# Patient Record
Sex: FEMALE | Race: WHITE | NOT HISPANIC OR LATINO
[De-identification: names, ages, dates, MRNs, and addresses within clinical notes are randomized per-mention and may not be internally consistent; named-entity substitution may affect disease eponyms.]

---

## 2019-07-09 ENCOUNTER — RESULT REVIEW (OUTPATIENT)
Age: 29
End: 2019-07-09

## 2019-07-10 ENCOUNTER — APPOINTMENT (OUTPATIENT)
Dept: OBGYN | Facility: CLINIC | Age: 29
End: 2019-07-10

## 2019-07-10 ENCOUNTER — NON-APPOINTMENT (OUTPATIENT)
Age: 29
End: 2019-07-10

## 2019-07-10 ENCOUNTER — APPOINTMENT (OUTPATIENT)
Dept: OBGYN | Facility: CLINIC | Age: 29
End: 2019-07-10
Payer: MEDICAID

## 2019-07-10 ENCOUNTER — OUTPATIENT (OUTPATIENT)
Dept: OUTPATIENT SERVICES | Facility: HOSPITAL | Age: 29
LOS: 1 days | End: 2019-07-10
Payer: MEDICAID

## 2019-07-10 VITALS
SYSTOLIC BLOOD PRESSURE: 101 MMHG | DIASTOLIC BLOOD PRESSURE: 67 MMHG | BODY MASS INDEX: 29.44 KG/M2 | HEIGHT: 62 IN | WEIGHT: 160 LBS

## 2019-07-10 DIAGNOSIS — Z34.93 ENCOUNTER FOR SUPERVISION OF NORMAL PREGNANCY, UNSPECIFIED, THIRD TRIMESTER: ICD-10-CM

## 2019-07-10 DIAGNOSIS — Z34.00 ENCOUNTER FOR SUPERVISION OF NORMAL FIRST PREGNANCY, UNSPECIFIED TRIMESTER: ICD-10-CM

## 2019-07-10 DIAGNOSIS — O09.33 SUPERVISION OF PREGNANCY WITH INSUFFICIENT ANTENATAL CARE, THIRD TRIMESTER: ICD-10-CM

## 2019-07-10 PROCEDURE — 99214 OFFICE O/P EST MOD 30 MIN: CPT | Mod: NC

## 2019-07-10 PROCEDURE — 86762 RUBELLA ANTIBODY: CPT

## 2019-07-10 PROCEDURE — 87902 NFCT AGT GNTYP ALYS HEP C: CPT

## 2019-07-10 PROCEDURE — 36415 COLL VENOUS BLD VENIPUNCTURE: CPT | Mod: NC

## 2019-07-10 PROCEDURE — 80076 HEPATIC FUNCTION PANEL: CPT

## 2019-07-10 PROCEDURE — 86787 VARICELLA-ZOSTER ANTIBODY: CPT

## 2019-07-10 PROCEDURE — 83655 ASSAY OF LEAD: CPT

## 2019-07-10 PROCEDURE — 86901 BLOOD TYPING SEROLOGIC RH(D): CPT

## 2019-07-10 PROCEDURE — 83020 HEMOGLOBIN ELECTROPHORESIS: CPT | Mod: 26

## 2019-07-10 PROCEDURE — 81003 URINALYSIS AUTO W/O SCOPE: CPT

## 2019-07-10 PROCEDURE — 87389 HIV-1 AG W/HIV-1&-2 AB AG IA: CPT

## 2019-07-10 PROCEDURE — 86780 TREPONEMA PALLIDUM: CPT

## 2019-07-10 PROCEDURE — 83020 HEMOGLOBIN ELECTROPHORESIS: CPT

## 2019-07-10 PROCEDURE — 86850 RBC ANTIBODY SCREEN: CPT

## 2019-07-10 PROCEDURE — 86900 BLOOD TYPING SEROLOGIC ABO: CPT

## 2019-07-10 PROCEDURE — 81025 URINE PREGNANCY TEST: CPT

## 2019-07-10 PROCEDURE — 87340 HEPATITIS B SURFACE AG IA: CPT

## 2019-07-10 PROCEDURE — G0463: CPT

## 2019-07-10 PROCEDURE — 87086 URINE CULTURE/COLONY COUNT: CPT

## 2019-07-10 PROCEDURE — 86480 TB TEST CELL IMMUN MEASURE: CPT

## 2019-07-10 PROCEDURE — 83036 HEMOGLOBIN GLYCOSYLATED A1C: CPT

## 2019-07-10 PROCEDURE — 82950 GLUCOSE TEST: CPT

## 2019-07-10 PROCEDURE — 36415 COLL VENOUS BLD VENIPUNCTURE: CPT

## 2019-07-11 LAB
ALBUMIN SERPL ELPH-MCNC: 3.4 G/DL — SIGNIFICANT CHANGE UP (ref 3.3–5)
ALP SERPL-CCNC: 150 U/L — HIGH (ref 40–120)
ALT FLD-CCNC: 31 U/L — SIGNIFICANT CHANGE UP (ref 10–45)
APPEARANCE UR: CLEAR — SIGNIFICANT CHANGE UP
AST SERPL-CCNC: 33 U/L — SIGNIFICANT CHANGE UP (ref 10–40)
BASOPHILS # BLD AUTO: 0.02 K/UL — SIGNIFICANT CHANGE UP (ref 0–0.2)
BASOPHILS NFR BLD AUTO: 0.2 % — SIGNIFICANT CHANGE UP (ref 0–2)
BILIRUB DIRECT SERPL-MCNC: 0.2 MG/DL — SIGNIFICANT CHANGE UP (ref 0–0.2)
BILIRUB INDIRECT FLD-MCNC: 0.2 MG/DL — SIGNIFICANT CHANGE UP (ref 0.2–1.2)
BILIRUB SERPL-MCNC: 0.3 MG/DL — SIGNIFICANT CHANGE UP (ref 0.2–1.2)
BILIRUB UR-MCNC: NEGATIVE — SIGNIFICANT CHANGE UP
COLOR SPEC: YELLOW — SIGNIFICANT CHANGE UP
DIFF PNL FLD: NEGATIVE — SIGNIFICANT CHANGE UP
EOSINOPHIL # BLD AUTO: 0.06 K/UL — SIGNIFICANT CHANGE UP (ref 0–0.5)
EOSINOPHIL NFR BLD AUTO: 0.7 % — SIGNIFICANT CHANGE UP (ref 0–6)
GLUCOSE 1H P GLC SERPL-MCNC: 125 MG/DL — SIGNIFICANT CHANGE UP (ref 70–199)
GLUCOSE UR QL: NEGATIVE — SIGNIFICANT CHANGE UP
HBA1C BLD-MCNC: 5 % — SIGNIFICANT CHANGE UP (ref 4–5.6)
HBV SURFACE AG SER-ACNC: SIGNIFICANT CHANGE UP
HCT VFR BLD CALC: 35.9 % — SIGNIFICANT CHANGE UP (ref 34.5–45)
HEMOGLOBIN INTERPRETATION: SIGNIFICANT CHANGE UP
HGB A MFR BLD: 97.1 % — SIGNIFICANT CHANGE UP (ref 95.8–98)
HGB A2 MFR BLD: 2.9 % — SIGNIFICANT CHANGE UP (ref 2–3.2)
HGB BLD-MCNC: 11.2 G/DL — LOW (ref 11.5–15.5)
HIV 1+2 AB+HIV1 P24 AG SERPL QL IA: SIGNIFICANT CHANGE UP
IMM GRANULOCYTES NFR BLD AUTO: 0.7 % — SIGNIFICANT CHANGE UP (ref 0–1.5)
KETONES UR-MCNC: NEGATIVE — SIGNIFICANT CHANGE UP
LEUKOCYTE ESTERASE UR-ACNC: NEGATIVE — SIGNIFICANT CHANGE UP
LYMPHOCYTES # BLD AUTO: 2.02 K/UL — SIGNIFICANT CHANGE UP (ref 1–3.3)
LYMPHOCYTES # BLD AUTO: 23.5 % — SIGNIFICANT CHANGE UP (ref 13–44)
MCHC RBC-ENTMCNC: 28.6 PG — SIGNIFICANT CHANGE UP (ref 27–34)
MCHC RBC-ENTMCNC: 31.2 GM/DL — LOW (ref 32–36)
MCV RBC AUTO: 91.8 FL — SIGNIFICANT CHANGE UP (ref 80–100)
MONOCYTES # BLD AUTO: 0.78 K/UL — SIGNIFICANT CHANGE UP (ref 0–0.9)
MONOCYTES NFR BLD AUTO: 9.1 % — SIGNIFICANT CHANGE UP (ref 2–14)
NEUTROPHILS # BLD AUTO: 5.67 K/UL — SIGNIFICANT CHANGE UP (ref 1.8–7.4)
NEUTROPHILS NFR BLD AUTO: 65.8 % — SIGNIFICANT CHANGE UP (ref 43–77)
NITRITE UR-MCNC: NEGATIVE — SIGNIFICANT CHANGE UP
PH UR: 6.5 — SIGNIFICANT CHANGE UP (ref 5–8)
PLATELET # BLD AUTO: 177 K/UL — SIGNIFICANT CHANGE UP (ref 150–400)
PROT SERPL-MCNC: 6.3 G/DL — SIGNIFICANT CHANGE UP (ref 6–8.3)
PROT UR-MCNC: SIGNIFICANT CHANGE UP
RBC # BLD: 3.91 M/UL — SIGNIFICANT CHANGE UP (ref 3.8–5.2)
RBC # FLD: 14.1 % — SIGNIFICANT CHANGE UP (ref 10.3–14.5)
RUBV IGG SER-ACNC: 4.8 INDEX — SIGNIFICANT CHANGE UP
RUBV IGG SER-IMP: POSITIVE — SIGNIFICANT CHANGE UP
SP GR SPEC: 1.02 — SIGNIFICANT CHANGE UP (ref 1.01–1.02)
T PALLIDUM AB TITR SER: NEGATIVE — SIGNIFICANT CHANGE UP
UROBILINOGEN FLD QL: SIGNIFICANT CHANGE UP
VZV IGG FLD QL IA: 231 INDEX — SIGNIFICANT CHANGE UP
VZV IGG FLD QL IA: POSITIVE — SIGNIFICANT CHANGE UP
WBC # BLD: 8.61 K/UL — SIGNIFICANT CHANGE UP (ref 3.8–10.5)
WBC # FLD AUTO: 8.61 K/UL — SIGNIFICANT CHANGE UP (ref 3.8–10.5)

## 2019-07-12 ENCOUNTER — OUTPATIENT (OUTPATIENT)
Dept: OUTPATIENT SERVICES | Facility: HOSPITAL | Age: 29
LOS: 1 days | End: 2019-07-12
Payer: MEDICAID

## 2019-07-12 ENCOUNTER — APPOINTMENT (OUTPATIENT)
Dept: OBGYN | Facility: CLINIC | Age: 29
End: 2019-07-12

## 2019-07-12 DIAGNOSIS — Z00.00 ENCOUNTER FOR GENERAL ADULT MEDICAL EXAMINATION WITHOUT ABNORMAL FINDINGS: ICD-10-CM

## 2019-07-12 LAB
CULTURE RESULTS: SIGNIFICANT CHANGE UP
SPECIMEN SOURCE: SIGNIFICANT CHANGE UP

## 2019-07-12 PROCEDURE — G0463: CPT

## 2019-07-12 PROCEDURE — 96372 THER/PROPH/DIAG INJ SC/IM: CPT

## 2019-07-12 PROCEDURE — 81220 CFTR GENE COM VARIANTS: CPT

## 2019-07-13 LAB
GAMMA INTERFERON BACKGROUND BLD IA-ACNC: 0.04 IU/ML — SIGNIFICANT CHANGE UP
M TB IFN-G BLD-IMP: NEGATIVE — SIGNIFICANT CHANGE UP
M TB IFN-G CD4+ BCKGRND COR BLD-ACNC: 0 IU/ML — SIGNIFICANT CHANGE UP
M TB IFN-G CD4+CD8+ BCKGRND COR BLD-ACNC: -0.01 IU/ML — SIGNIFICANT CHANGE UP
QUANT TB PLUS MITOGEN MINUS NIL: 7.56 IU/ML — SIGNIFICANT CHANGE UP

## 2019-07-16 DIAGNOSIS — Z3A.32 32 WEEKS GESTATION OF PREGNANCY: ICD-10-CM

## 2019-07-16 DIAGNOSIS — Z34.93 ENCOUNTER FOR SUPERVISION OF NORMAL PREGNANCY, UNSPECIFIED, THIRD TRIMESTER: ICD-10-CM

## 2019-07-16 DIAGNOSIS — O36.0191 MATERNAL CARE FOR ANTI-D [RH] ANTIBODIES, UNSPECIFIED TRIMESTER, FETUS 1: ICD-10-CM

## 2019-07-16 DIAGNOSIS — F11.20 OPIOID DEPENDENCE, UNCOMPLICATED: ICD-10-CM

## 2019-07-16 DIAGNOSIS — B18.2 CHRONIC VIRAL HEPATITIS C: ICD-10-CM

## 2019-07-16 LAB — LEAD BLD-MCNC: 2 UG/DL — SIGNIFICANT CHANGE UP (ref 0–4)

## 2019-07-18 ENCOUNTER — APPOINTMENT (OUTPATIENT)
Dept: ANTEPARTUM | Facility: CLINIC | Age: 29
End: 2019-07-18
Payer: MEDICAID

## 2019-07-18 ENCOUNTER — ASOB RESULT (OUTPATIENT)
Age: 29
End: 2019-07-18

## 2019-07-18 LAB
CYSTIC FIBROSIS EXPANDED PANEL: SIGNIFICANT CHANGE UP
GRAZOPREVIR RESISTANCE: SIGNIFICANT CHANGE UP
HCV NS3 MUT DET ISLT GENOTYP: SIGNIFICANT CHANGE UP
PARITAPREVIR RESISTANCE: SIGNIFICANT CHANGE UP
SIMPREVIR RESISTANCE: SIGNIFICANT CHANGE UP

## 2019-07-18 PROCEDURE — 76816 OB US FOLLOW-UP PER FETUS: CPT

## 2019-07-22 ENCOUNTER — APPOINTMENT (OUTPATIENT)
Dept: OBGYN | Facility: HOSPITAL | Age: 29
End: 2019-07-22

## 2019-08-10 ENCOUNTER — INPATIENT (INPATIENT)
Facility: HOSPITAL | Age: 29
LOS: 2 days | Discharge: HOME | End: 2019-08-13
Attending: SPECIALIST | Admitting: SPECIALIST
Payer: MEDICAID

## 2019-08-10 ENCOUNTER — EMERGENCY (EMERGENCY)
Facility: HOSPITAL | Age: 29
LOS: 1 days | Discharge: STILL ADMITTED OR ADMINISTRATIVE | End: 2019-08-10
Admitting: EMERGENCY MEDICINE

## 2019-08-10 VITALS
DIASTOLIC BLOOD PRESSURE: 79 MMHG | TEMPERATURE: 98 F | SYSTOLIC BLOOD PRESSURE: 126 MMHG | RESPIRATION RATE: 16 BRPM | HEART RATE: 85 BPM

## 2019-08-10 VITALS
DIASTOLIC BLOOD PRESSURE: 79 MMHG | OXYGEN SATURATION: 99 % | RESPIRATION RATE: 16 BRPM | HEART RATE: 104 BPM | SYSTOLIC BLOOD PRESSURE: 132 MMHG | TEMPERATURE: 98 F

## 2019-08-10 DIAGNOSIS — Z3A.00 WEEKS OF GESTATION OF PREGNANCY NOT SPECIFIED: ICD-10-CM

## 2019-08-10 DIAGNOSIS — O26.899 OTHER SPECIFIED PREGNANCY RELATED CONDITIONS, UNSPECIFIED TRIMESTER: ICD-10-CM

## 2019-08-10 NOTE — ED ADULT NURSE NOTE - NS ED NURSE NOTE DISPO AOU DETAILS FT
Spoke to SHOSHANA Fenton pt okay to go to L&D. Pt transported with [facility].

## 2019-08-10 NOTE — OB RN TRIAGE NOTE - PMH
Drug abuse  on methadone  Hepatitis C    Miscarriage  NO D&C  Termination of pregnancy (fetus)  with D&C  Vaginal delivery  FT  5/29/16 , NO COMPLICATION

## 2019-08-10 NOTE — ED ADULT TRIAGE NOTE - CHIEF COMPLAINT QUOTE
Pt approx 38 weeks pregnant c/o rupture of membranes 45 min ago. Denies urge to push. Denies any pain/discomfort. LENA 9/1. Pt evaluated by MD Arana, pt okay to go to L&D per MD. Spoke to L&KATHARINE Fenton, pt okay to go to L&D.

## 2019-08-11 LAB
AMPHET UR-MCNC: NEGATIVE — SIGNIFICANT CHANGE UP
ANTIBODY ID 1_1: SIGNIFICANT CHANGE UP
BARBITURATES UR SCN-MCNC: NEGATIVE — SIGNIFICANT CHANGE UP
BASOPHILS # BLD AUTO: 0.02 K/UL — SIGNIFICANT CHANGE UP (ref 0–0.2)
BASOPHILS # BLD AUTO: 0.03 K/UL — SIGNIFICANT CHANGE UP (ref 0–0.2)
BASOPHILS NFR BLD AUTO: 0.2 % — SIGNIFICANT CHANGE UP (ref 0–2)
BASOPHILS NFR BLD AUTO: 0.3 % — SIGNIFICANT CHANGE UP (ref 0–2)
BENZODIAZ UR-MCNC: NEGATIVE — SIGNIFICANT CHANGE UP
BLD GP AB SCN SERPL QL: POSITIVE — SIGNIFICANT CHANGE UP
CANNABINOIDS UR-MCNC: NEGATIVE — SIGNIFICANT CHANGE UP
COCAINE METAB.OTHER UR-MCNC: NEGATIVE — SIGNIFICANT CHANGE UP
DAT POLY-SP REAG RBC QL: NEGATIVE — SIGNIFICANT CHANGE UP
EOSINOPHIL # BLD AUTO: 0.09 K/UL — SIGNIFICANT CHANGE UP (ref 0–0.5)
EOSINOPHIL # BLD AUTO: 0.16 K/UL — SIGNIFICANT CHANGE UP (ref 0–0.5)
EOSINOPHIL NFR BLD AUTO: 0.7 % — SIGNIFICANT CHANGE UP (ref 0–6)
EOSINOPHIL NFR BLD AUTO: 2.3 % — SIGNIFICANT CHANGE UP (ref 0–6)
HCT VFR BLD CALC: 33.7 % — LOW (ref 34.5–45)
HCT VFR BLD CALC: 36.3 % — SIGNIFICANT CHANGE UP (ref 34.5–45)
HGB BLD-MCNC: 10.8 G/DL — LOW (ref 11.5–15.5)
HGB BLD-MCNC: 11.6 G/DL — SIGNIFICANT CHANGE UP (ref 11.5–15.5)
IMM GRANULOCYTES NFR BLD AUTO: 0.5 % — SIGNIFICANT CHANGE UP (ref 0–1.5)
IMM GRANULOCYTES NFR BLD AUTO: 0.7 % — SIGNIFICANT CHANGE UP (ref 0–1.5)
LYMPHOCYTES # BLD AUTO: 1.41 K/UL — SIGNIFICANT CHANGE UP (ref 1–3.3)
LYMPHOCYTES # BLD AUTO: 1.78 K/UL — SIGNIFICANT CHANGE UP (ref 1–3.3)
LYMPHOCYTES # BLD AUTO: 10.9 % — LOW (ref 13–44)
LYMPHOCYTES # BLD AUTO: 25.4 % — SIGNIFICANT CHANGE UP (ref 13–44)
MCHC RBC-ENTMCNC: 28.5 PG — SIGNIFICANT CHANGE UP (ref 27–34)
MCHC RBC-ENTMCNC: 28.6 PG — SIGNIFICANT CHANGE UP (ref 27–34)
MCHC RBC-ENTMCNC: 32 % — SIGNIFICANT CHANGE UP (ref 32–36)
MCHC RBC-ENTMCNC: 32 % — SIGNIFICANT CHANGE UP (ref 32–36)
MCV RBC AUTO: 89.2 FL — SIGNIFICANT CHANGE UP (ref 80–100)
MCV RBC AUTO: 89.4 FL — SIGNIFICANT CHANGE UP (ref 80–100)
METHADONE UR-MCNC: POSITIVE — SIGNIFICANT CHANGE UP
MONOCYTES # BLD AUTO: 0.68 K/UL — SIGNIFICANT CHANGE UP (ref 0–0.9)
MONOCYTES # BLD AUTO: 1.1 K/UL — HIGH (ref 0–0.9)
MONOCYTES NFR BLD AUTO: 8.5 % — SIGNIFICANT CHANGE UP (ref 2–14)
MONOCYTES NFR BLD AUTO: 9.7 % — SIGNIFICANT CHANGE UP (ref 2–14)
NEUTROPHILS # BLD AUTO: 10.29 K/UL — HIGH (ref 1.8–7.4)
NEUTROPHILS # BLD AUTO: 4.31 K/UL — SIGNIFICANT CHANGE UP (ref 1.8–7.4)
NEUTROPHILS NFR BLD AUTO: 61.6 % — SIGNIFICANT CHANGE UP (ref 43–77)
NEUTROPHILS NFR BLD AUTO: 79.2 % — HIGH (ref 43–77)
NRBC # FLD: 0.02 K/UL — SIGNIFICANT CHANGE UP (ref 0–0)
NRBC # FLD: 0.03 K/UL — SIGNIFICANT CHANGE UP (ref 0–0)
OPIATES UR-MCNC: NEGATIVE — SIGNIFICANT CHANGE UP
OXYCODONE UR-MCNC: NEGATIVE — SIGNIFICANT CHANGE UP
PCP UR-MCNC: NEGATIVE — SIGNIFICANT CHANGE UP
PLATELET # BLD AUTO: 175 K/UL — SIGNIFICANT CHANGE UP (ref 150–400)
PLATELET # BLD AUTO: 178 K/UL — SIGNIFICANT CHANGE UP (ref 150–400)
PMV BLD: 11.3 FL — SIGNIFICANT CHANGE UP (ref 7–13)
PMV BLD: 11.7 FL — SIGNIFICANT CHANGE UP (ref 7–13)
RBC # BLD: 3.77 M/UL — LOW (ref 3.8–5.2)
RBC # BLD: 4.07 M/UL — SIGNIFICANT CHANGE UP (ref 3.8–5.2)
RBC # FLD: 15.3 % — HIGH (ref 10.3–14.5)
RBC # FLD: 15.6 % — HIGH (ref 10.3–14.5)
RH IG SCN BLD-IMP: NEGATIVE — SIGNIFICANT CHANGE UP
WBC # BLD: 12.98 K/UL — HIGH (ref 3.8–10.5)
WBC # BLD: 7 K/UL — SIGNIFICANT CHANGE UP (ref 3.8–10.5)
WBC # FLD AUTO: 12.98 K/UL — HIGH (ref 3.8–10.5)
WBC # FLD AUTO: 7 K/UL — SIGNIFICANT CHANGE UP (ref 3.8–10.5)

## 2019-08-11 PROCEDURE — 59409 OBSTETRICAL CARE: CPT | Mod: U9,UB,GC

## 2019-08-11 PROCEDURE — 86077 PHYS BLOOD BANK SERV XMATCH: CPT

## 2019-08-11 RX ORDER — METHADONE HYDROCHLORIDE 40 MG/1
175 TABLET ORAL
Refills: 0 | Status: DISCONTINUED | OUTPATIENT
Start: 2019-08-11 | End: 2019-08-11

## 2019-08-11 RX ORDER — DIBUCAINE 1 %
1 OINTMENT (GRAM) RECTAL EVERY 6 HOURS
Refills: 0 | Status: DISCONTINUED | OUTPATIENT
Start: 2019-08-11 | End: 2019-08-13

## 2019-08-11 RX ORDER — HYDROCORTISONE 1 %
1 OINTMENT (GRAM) TOPICAL EVERY 6 HOURS
Refills: 0 | Status: DISCONTINUED | OUTPATIENT
Start: 2019-08-11 | End: 2019-08-13

## 2019-08-11 RX ORDER — OXYCODONE HYDROCHLORIDE 5 MG/1
5 TABLET ORAL
Refills: 0 | Status: DISCONTINUED | OUTPATIENT
Start: 2019-08-11 | End: 2019-08-13

## 2019-08-11 RX ORDER — DIPHENHYDRAMINE HCL 50 MG
25 CAPSULE ORAL EVERY 6 HOURS
Refills: 0 | Status: DISCONTINUED | OUTPATIENT
Start: 2019-08-11 | End: 2019-08-13

## 2019-08-11 RX ORDER — PRAMOXINE HYDROCHLORIDE 150 MG/15G
1 AEROSOL, FOAM RECTAL EVERY 4 HOURS
Refills: 0 | Status: DISCONTINUED | OUTPATIENT
Start: 2019-08-11 | End: 2019-08-13

## 2019-08-11 RX ORDER — BENZOCAINE 10 %
1 GEL (GRAM) MUCOUS MEMBRANE EVERY 6 HOURS
Refills: 0 | Status: DISCONTINUED | OUTPATIENT
Start: 2019-08-11 | End: 2019-08-13

## 2019-08-11 RX ORDER — SODIUM CHLORIDE 9 MG/ML
3 INJECTION INTRAMUSCULAR; INTRAVENOUS; SUBCUTANEOUS EVERY 8 HOURS
Refills: 0 | Status: DISCONTINUED | OUTPATIENT
Start: 2019-08-11 | End: 2019-08-13

## 2019-08-11 RX ORDER — OXYTOCIN 10 UNIT/ML
2 VIAL (ML) INJECTION
Qty: 30 | Refills: 0 | Status: DISCONTINUED | OUTPATIENT
Start: 2019-08-11 | End: 2019-08-11

## 2019-08-11 RX ORDER — ALPRAZOLAM 0.25 MG
1 TABLET ORAL
Qty: 0 | Refills: 0 | DISCHARGE

## 2019-08-11 RX ORDER — SODIUM CHLORIDE 9 MG/ML
1000 INJECTION, SOLUTION INTRAVENOUS
Refills: 0 | Status: DISCONTINUED | OUTPATIENT
Start: 2019-08-11 | End: 2019-08-11

## 2019-08-11 RX ORDER — TETANUS TOXOID, REDUCED DIPHTHERIA TOXOID AND ACELLULAR PERTUSSIS VACCINE, ADSORBED 5; 2.5; 8; 8; 2.5 [IU]/.5ML; [IU]/.5ML; UG/.5ML; UG/.5ML; UG/.5ML
0.5 SUSPENSION INTRAMUSCULAR ONCE
Refills: 0 | Status: DISCONTINUED | OUTPATIENT
Start: 2019-08-11 | End: 2019-08-13

## 2019-08-11 RX ORDER — IBUPROFEN 200 MG
600 TABLET ORAL EVERY 6 HOURS
Refills: 0 | Status: COMPLETED | OUTPATIENT
Start: 2019-08-11 | End: 2020-07-09

## 2019-08-11 RX ORDER — OXYCODONE HYDROCHLORIDE 5 MG/1
5 TABLET ORAL ONCE
Refills: 0 | Status: DISCONTINUED | OUTPATIENT
Start: 2019-08-11 | End: 2019-08-13

## 2019-08-11 RX ORDER — ALPRAZOLAM 0.25 MG
2 TABLET ORAL EVERY 8 HOURS
Refills: 0 | Status: DISCONTINUED | OUTPATIENT
Start: 2019-08-11 | End: 2019-08-13

## 2019-08-11 RX ORDER — OXYTOCIN 10 UNIT/ML
VIAL (ML) INJECTION
Qty: 20 | Refills: 0 | Status: COMPLETED | OUTPATIENT
Start: 2019-08-11 | End: 2019-08-11

## 2019-08-11 RX ORDER — DOCUSATE SODIUM 100 MG
100 CAPSULE ORAL
Refills: 0 | Status: DISCONTINUED | OUTPATIENT
Start: 2019-08-11 | End: 2019-08-13

## 2019-08-11 RX ORDER — ACETAMINOPHEN 500 MG
975 TABLET ORAL
Refills: 0 | Status: DISCONTINUED | OUTPATIENT
Start: 2019-08-11 | End: 2019-08-13

## 2019-08-11 RX ORDER — SODIUM CHLORIDE 9 MG/ML
500 INJECTION, SOLUTION INTRAVENOUS ONCE
Refills: 0 | Status: COMPLETED | OUTPATIENT
Start: 2019-08-11 | End: 2019-08-11

## 2019-08-11 RX ORDER — SIMETHICONE 80 MG/1
80 TABLET, CHEWABLE ORAL EVERY 4 HOURS
Refills: 0 | Status: DISCONTINUED | OUTPATIENT
Start: 2019-08-11 | End: 2019-08-13

## 2019-08-11 RX ORDER — KETOROLAC TROMETHAMINE 30 MG/ML
30 SYRINGE (ML) INJECTION ONCE
Refills: 0 | Status: DISCONTINUED | OUTPATIENT
Start: 2019-08-11 | End: 2019-08-11

## 2019-08-11 RX ORDER — DEXTROAMPHETAMINE SACCHARATE, AMPHETAMINE ASPARTATE, DEXTROAMPHETAMINE SULFATE AND AMPHETAMINE SULFATE 1.875; 1.875; 1.875; 1.875 MG/1; MG/1; MG/1; MG/1
30 TABLET ORAL
Refills: 0 | Status: DISCONTINUED | OUTPATIENT
Start: 2019-08-11 | End: 2019-08-11

## 2019-08-11 RX ORDER — LANOLIN
1 OINTMENT (GRAM) TOPICAL EVERY 6 HOURS
Refills: 0 | Status: DISCONTINUED | OUTPATIENT
Start: 2019-08-11 | End: 2019-08-13

## 2019-08-11 RX ORDER — METHADONE HYDROCHLORIDE 40 MG/1
175 TABLET ORAL DAILY
Refills: 0 | Status: DISCONTINUED | OUTPATIENT
Start: 2019-08-11 | End: 2019-08-13

## 2019-08-11 RX ORDER — MAGNESIUM HYDROXIDE 400 MG/1
30 TABLET, CHEWABLE ORAL
Refills: 0 | Status: DISCONTINUED | OUTPATIENT
Start: 2019-08-11 | End: 2019-08-13

## 2019-08-11 RX ORDER — GLYCERIN ADULT
1 SUPPOSITORY, RECTAL RECTAL AT BEDTIME
Refills: 0 | Status: DISCONTINUED | OUTPATIENT
Start: 2019-08-11 | End: 2019-08-13

## 2019-08-11 RX ORDER — AER TRAVELER 0.5 G/1
1 SOLUTION RECTAL; TOPICAL EVERY 4 HOURS
Refills: 0 | Status: DISCONTINUED | OUTPATIENT
Start: 2019-08-11 | End: 2019-08-13

## 2019-08-11 RX ORDER — METHADONE HYDROCHLORIDE 40 MG/1
175 TABLET ORAL
Qty: 0 | Refills: 0 | DISCHARGE

## 2019-08-11 RX ADMIN — METHADONE HYDROCHLORIDE 175 MILLIGRAM(S): 40 TABLET ORAL at 11:25

## 2019-08-11 RX ADMIN — SODIUM CHLORIDE 125 MILLILITER(S): 9 INJECTION, SOLUTION INTRAVENOUS at 05:11

## 2019-08-11 RX ADMIN — Medication 975 MILLIGRAM(S): at 17:50

## 2019-08-11 RX ADMIN — SODIUM CHLORIDE 1000 MILLILITER(S): 9 INJECTION, SOLUTION INTRAVENOUS at 22:20

## 2019-08-11 RX ADMIN — Medication 30 MILLIGRAM(S): at 20:22

## 2019-08-11 RX ADMIN — Medication 30 MILLIGRAM(S): at 19:41

## 2019-08-11 RX ADMIN — Medication 1000 MILLIUNIT(S)/MIN: at 15:53

## 2019-08-11 RX ADMIN — Medication 2 MILLIUNIT(S)/MIN: at 11:59

## 2019-08-11 NOTE — OB PROVIDER H&P - NSHPSOCIALHISTORY_GEN_ALL_CORE
HX of heroin use, clean since 2016  On methadone since then  Methadone 175mg QAM ([facility]- [phone])

## 2019-08-11 NOTE — OB PROVIDER H&P - NSHPPHYSICALEXAM_GEN_ALL_CORE
Assessment reveals VSS  Abdomen soft, NT, gravid   SSE: positive pooling, positive nitrazine, positive ferning  VE: 2/50/-3  Cat 1 tracing, occasional CTX on toco

## 2019-08-11 NOTE — CHART NOTE - NSCHARTNOTEFT_GEN_A_CORE
R3 OB Note     Went to examine for cervical change. Patient requesting epidural.     Vital Signs Last 24 Hrs  T(C): 36.6 (11 Aug 2019 05:39), Max: 36.7 (10 Aug 2019 23:48)  T(F): 97.88 (11 Aug 2019 05:39), Max: 98.1 (10 Aug 2019 23:48)  HR: 87 (11 Aug 2019 08:22) (71 - 104)  BP: 96/53 (11 Aug 2019 08:22) (96/53 - 132/79)  BP(mean): --  RR: 18 (11 Aug 2019 04:53) (16 - 18)  SpO2: 99% (10 Aug 2019 22:29) (99% - 99%)    FHR: 120/mod/accels-/intermittent variable decel  [place]: infrequent     A/P: 29yo  at 37w0d presenting w/ SROM at 9pm, with history significant for Hep C and currently on methadone, in stable condition.  - s/p vag cyto x1  - Methadone 175 to be given, verification by methadone clinic in progress  - Patient desires epidural, will alert anesthesia      D/w Dr. Sagar Walsh PGY3

## 2019-08-11 NOTE — OB PROVIDER TRIAGE NOTE - HISTORY OF PRESENT ILLNESS
29y/o  @37ks [facility] patient presents with SROM since 21:30 clear fluid.   Patient reports good fetal movement. Denies VB.   SOCIAL HX: Hepatitis C, HX of Drug use on methadone    Allergies: Denies  Medications: Xanax last dose 9pm, Methadone 175mg QAM ([facility]- [phone])    Medical HX: Hepatitis C positive  Surgical HX: Denies  Psy: Anxiety  Drugs: HX of heroin use, last used 2016  Former Smoker- stopped   Denies Etoh abuse

## 2019-08-11 NOTE — OB PROVIDER TRIAGE NOTE - NS_OBGYNHISTORY_OBGYN_ALL_OB_FT
GYN: Juan Antonio  OB:  2015, FT, Uncomplicated, Male 7#7oz        TOPx1, SABx1      AP course complicated by methadone use, xanax use, 3 prenatal visits, hepatitis c  A Negative blood, Rhogam on 19

## 2019-08-11 NOTE — CHART NOTE - NSCHARTNOTEFT_GEN_A_CORE
R3 OB Note     Called to room for concern of increased vaginal bleeding after delivery. Bimanual exam performed and a total of ~200cc of clot expressed from lower uterine segment. Patient had not yet voided or ambulated. Denies any lightheadedness, dizziness, CP/SOB, palpitations.     VS  T(C): 36.8 (19 @ 14:00)  HR: 76 (19 @ 17:15)  BP: 120/61 (19 @ 17:12)  RR: 18 (19 @ 04:53)  SpO2: 98% (19 @ 17:10)    Physical Exam:  General: NAD  Abdomen: Soft, non-tender, non-distended, fundus firm  : bimanual exam with ~200cc expressed. Hernandez placed with 1L clear light yellow urine.   Ext: No pain or swelling    A/P: 29yo  s/p  w/ , now with 200cc PPH, in stable condition.  - Hernandez placed at this time, plan to watch UOP and remove in 6 hrs (11pm) if adequate UOP and bleeding WNL  - Continue to closely monitor bleeding  - Monitor vital signs. If any changes or patient becomes tachycardic or hypotensive, will draw CBC.     D/w Dr. Sagar Walsh PGY3

## 2019-08-11 NOTE — OB PROVIDER DELIVERY SUMMARY - NSPROVIDERDELIVERYNOTE_OBGYN_ALL_OB_FT
Spontaneous vaginal delivery of liveborn infant from FLORES position. RML episiotomy was created. Head, shoulders and body were delivered easily. Infant was suctioned. No Mec. Delayed cord clamping performed. Cord was clamped and cut and infant was passed to mother/peds. Placenta was delivered intact with 3 vessel cord. Fundal massage was given and uterine fundus was found to be firm. Vaginal exam revealed an intact cervix, vaginal walls and sulci. Patient had a 2nd degree laceration in the perineum that was repaired with 2.0 chromic suture. Excellent hemostasis was noted. Patient stable. Count was correct x2.

## 2019-08-11 NOTE — CHART NOTE - NSCHARTNOTEFT_GEN_A_CORE
Ob  note    went to evaluate pt c/o rectal pressure.  VE 10/100/+1.  Will begin pushing shortly.     minimal to moderate variability, +accels, no recent decels  [place] ctxs 2-3 mins      Beatriz Alfred MD

## 2019-08-11 NOTE — OB NEONATOLOGY/PEDIATRICIAN DELIVERY SUMMARY - NSPEDSNEONOTESA_OBGYN_ALL_OB_FT
37w F born to a 28y  via .  Pediatrics called for drug use during pregnancy.  Mom on methadone (175 mg qd, last 11:25a ), and Xanax (2mg QID prn) and is also hepC positive. 3 prenatal visits reported with [facility]. Maternal blood type A-, received Rhogam on 19.  PNL neg/NR/immune, GBS unknown.  SROM at 2130 on 8/10 (18 hrs), clear fluid. Tmax 37.1.  Baby born with decreased tone but crying spontaneously.  Warmed/dried/stimulated with oropharyngeal suctioning. Tremors noted in   Apgars 8/9, EOS 0.37.  Baby to be transferred to NICU for further evaluation and treatment for possible MARYLOU.

## 2019-08-11 NOTE — OB PROVIDER H&P - HISTORY OF PRESENT ILLNESS
29y/o  @37ks [facility] patient presents with SROM since 21:30 clear fluid.   Patient reports good fetal movement. Denies VB.   SOCIAL HX: Hepatitis C, HX of Drug use on methadone    Allergies: Denies  Medications: Xanax last dose 9pm, Methadone 175mg QAM ([facility]- [phone])    Medical HX: Hepatitis C positive  Surgical HX: Denies  Psy: Anxiety  Drugs: HX of heroin use, last used 2016  Former Smoker- stopped   Denies Etoh abuse

## 2019-08-11 NOTE — CHART NOTE - NSCHARTNOTEFT_GEN_A_CORE
Patient evaluated due to positive orthostatics.  BPs  Lying 113/66 P 71 to standing   Patient is s/p  with RML episiotomy.   +  due ROSANNE atony.    Patient received cytotec WI in L&D.  PMH: Hep C, Drug use: currently on methadone.  Anxiety (on xanax).  Patient with no complaints.  Patient denies HA, blurry vision, nausea, vomiting, CP or SOB   Patient is ambulating, voiding, passing gas and tolerating PO intake.      T(C): 36.5 (19 @ 20:44), Max: 36.9 (19 @ 16:12)  HR: 71 (19 @ 20:44) (64 - 143)  BP: 113/66 (19 @ 20:44) (89/53 - 149/87)  SpO2: 99% (19 @ 20:44) (91% - 100%)  Wt(kg): --  I&O's Summary    11 Aug 2019 07:  -  11 Aug 2019 22:31  --------------------------------------------------------  IN: 2000 mL / OUT: 4000 mL / NET: -2000 mL      I&O's Detail    11 Aug 2019 07:  -  11 Aug 2019 22:31  --------------------------------------------------------  IN:    Lactated Ringers IV Bolus: 500 mL    lactated ringers.: 625 mL    oxytocin Infusion: 875 mL  Total IN: 2000 mL    OUT:    Estimated Blood Loss: 500 mL    Indwelling Catheter - Urethral: 3500 mL  Total OUT: 4000 mL    Total NET: -2000 mL          Physical Exam  General: lying comfortably in bed, NAD   CV: RRR  Lungs: CTA b/l, good air flow b/l   Abd: fundus firm,  NTND   Ext: NT b/l, no edema.                11.6   7.00  )-----------( 175      ( 08-11 @ 01:55 )             36.3         A/P:   28y  PPD#0 evaluated for positive orthostatics.  Patient is hypotensive.  Shock index 1.1.  Patient asymptomatic.     Plan:   -Stat CBC  -500 cc LR bolus  -Continue to monitor  -Discuss with MD after results of CBC

## 2019-08-11 NOTE — CHART NOTE - NSCHARTNOTEFT_GEN_A_CORE
Spoke with Dr. aPcheco at the [facility] [phone]. Confirmed that patient takes Methadone 175mg daily and was sent home with her weekend dose on Friday 8/9/19. Confirmed with Pharmacy that she is able to get her Methadone here while inpatient    TREY Frey, PGY-1

## 2019-08-12 ENCOUNTER — TRANSCRIPTION ENCOUNTER (OUTPATIENT)
Age: 29
End: 2019-08-12

## 2019-08-12 LAB
RBC # BLD FETUS AUTO: 0 — SIGNIFICANT CHANGE UP
T PALLIDUM AB TITR SER: NEGATIVE — SIGNIFICANT CHANGE UP

## 2019-08-12 RX ORDER — IBUPROFEN 200 MG
600 TABLET ORAL EVERY 6 HOURS
Refills: 0 | Status: DISCONTINUED | OUTPATIENT
Start: 2019-08-12 | End: 2019-08-13

## 2019-08-12 RX ORDER — IBUPROFEN 200 MG
1 TABLET ORAL
Qty: 0 | Refills: 0 | DISCHARGE
Start: 2019-08-12

## 2019-08-12 RX ORDER — NORETHINDRONE 0.35 MG/1
1 TABLET ORAL
Qty: 30 | Refills: 11
Start: 2019-08-12 | End: 2020-08-05

## 2019-08-12 RX ORDER — ACETAMINOPHEN 500 MG
3 TABLET ORAL
Qty: 0 | Refills: 0 | DISCHARGE
Start: 2019-08-12

## 2019-08-12 RX ADMIN — Medication 600 MILLIGRAM(S): at 18:20

## 2019-08-12 RX ADMIN — Medication 975 MILLIGRAM(S): at 08:30

## 2019-08-12 RX ADMIN — METHADONE HYDROCHLORIDE 175 MILLIGRAM(S): 40 TABLET ORAL at 12:01

## 2019-08-12 RX ADMIN — Medication 2 MILLIGRAM(S): at 02:17

## 2019-08-12 RX ADMIN — Medication 975 MILLIGRAM(S): at 02:14

## 2019-08-12 RX ADMIN — Medication 100 MILLIGRAM(S): at 12:03

## 2019-08-12 RX ADMIN — Medication 600 MILLIGRAM(S): at 17:52

## 2019-08-12 RX ADMIN — Medication 975 MILLIGRAM(S): at 14:15

## 2019-08-12 RX ADMIN — SODIUM CHLORIDE 3 MILLILITER(S): 9 INJECTION INTRAMUSCULAR; INTRAVENOUS; SUBCUTANEOUS at 01:49

## 2019-08-12 RX ADMIN — Medication 975 MILLIGRAM(S): at 07:48

## 2019-08-12 RX ADMIN — Medication 600 MILLIGRAM(S): at 12:35

## 2019-08-12 RX ADMIN — Medication 600 MILLIGRAM(S): at 12:03

## 2019-08-12 RX ADMIN — Medication 975 MILLIGRAM(S): at 15:00

## 2019-08-12 RX ADMIN — SODIUM CHLORIDE 3 MILLILITER(S): 9 INJECTION INTRAMUSCULAR; INTRAVENOUS; SUBCUTANEOUS at 06:12

## 2019-08-12 RX ADMIN — Medication 975 MILLIGRAM(S): at 02:40

## 2019-08-12 NOTE — PROGRESS NOTE ADULT - ASSESSMENT
A/P: 27yo HepC+ PPD#1 s/p  c/b RML episiotomy and subsequent PPH due to ROSANNE atony with  (500 total). At that time a choudhary was placed which immediately put out 1L urine. Choudhary was d/tosin when patient came to the floor. Patient is stable and doing well post-partum.

## 2019-08-12 NOTE — PROVIDER CONTACT NOTE (OTHER) - ACTION/TREATMENT ORDERED:
Pt seen at bedside, orthostatic vitals to be repeated when bolus complete
Indwelling choudhary catheter placed by MD Walsh, draining to bedside.  Cytotec 1000mcg ID given by Dr. Frey at 17:08.  Pt to remain in L&D for an additional hour to monitor bleeding.
MD stovall made aware and cleared pt to transfer to postpartum unit. Will continue to monitor.

## 2019-08-12 NOTE — DISCHARGE NOTE OB - CARE PLAN
Principal Discharge DX:	Vaginal delivery  Goal:	normal recovery  Assessment and plan of treatment:	Make your follow-up appointment with your doctor as ordered.   No heavy lifting, driving, or strenuous activity for 6 weeks.  Nothing per vagina such as tampons, intercourse, douches or tub baths for 6 weeks or until you see your doctor.  Call your doctor if you're unable to tolerate food, increase in vaginal bleeding, or have difficulty urinating. Call your doctor if you have pain that is not relieved by your perscribed medications. Notify your doctor with any other concerns.

## 2019-08-12 NOTE — DISCHARGE NOTE OB - MATERIALS PROVIDED
MMR Vaccination (VIS Pub Date: 2012)/Tdap Vaccination (VIS Pub Date: 2012)/Birth Certificate Instructions/Shaken Baby Prevention Handout/Vaccinations/[facility] Screening Program/Guide to Postpartum Care

## 2019-08-12 NOTE — PROVIDER CONTACT NOTE (OTHER) - ASSESSMENT
Pt asymptomatic, denies dizziness, lightheadedness, CP/SOB, or palpitations. Voided 200ml, tolerating PO fluids. Pt encouraged to drink. Pt safety maintained. Pt educated to call for assistance before getting up to use the bathroom.
Fundus firm and midline at umbilicus upon palpation.  Heavy vaginal bleeding noted and clots noted.  Dr. Robinson called to bedside for assessment.  Pt vital signs stable (see OB flowsheet). Upon vaginal exam performed by Dr. Walsh, states that pt bladder feels full.  Additional EBL of 200cc.
Orthostatic BP drop greater than 20 from laying to sitting. Laying 130/78 HR 67; sitting 108/64 HR 79; standing 108/64 Hr 92. Pt asymptomatic; denies weakness or dizziness .

## 2019-08-12 NOTE — PROGRESS NOTE ADULT - ATTENDING COMMENTS
Associate Chief of L & D  This patient is unfamiliar to me.  I have met the patient for the first time today, I have also reviewed her chart and it appears that she Had a vaginal delivery that resulted in a RML episiotomy and a PPH. the patient was delivered by Shante Gonzalez and Nico.  Her medical history is significant for Hep C and drug use on methadone treatment of 175 mg.     VS  T(C): 36.8 (19 @ 05:16)  HR: 68 (19 @ 05:16)  BP: 112/66 (19 @ 05:16)  RR: 18 (19 @ 05:16)  SpO2: 100% (19 @ 05:16)  Abd: fundus firm  Lochia: scant  Perineum: suture in situ, mildly swollen  Ext : trace edema    s/p  PPD#1  with repair of RML and PPH due to atony, H/o drug use  continue to follow closely  routine PP care  discussed perineal care  continue methadone dosage  social work to see patient   follow her H/H

## 2019-08-12 NOTE — DISCHARGE NOTE OB - PATIENT PORTAL LINK FT
You can access the [email or web address] Patient Portal, offered by [facility], by registering with the following website: [email or web address]

## 2019-08-12 NOTE — DISCHARGE NOTE OB - MEDICATION SUMMARY - MEDICATIONS TO TAKE
I will START or STAY ON the medications listed below when I get home from the hospital:    methadone  -- 175 tab(s) by mouth once a day  -- Indication: For home med    acetaminophen 325 mg oral tablet  -- 3 tab(s) by mouth   -- Indication: For postpartum pain    ibuprofen 600 mg oral tablet  -- 1 tab(s) by mouth every 6 hours  -- Indication: For postpartum pain    ALPRAZolam 2 mg oral tablet, extended release  -- 1 tab(s) by mouth 4 times a day  -- Indication: For home med    Prenatal 1 oral capsule  -- 1 tab(s) by mouth once a day  -- Indication: For Take if breast feeding    norethindrone 0.35 mg oral tablet  -- 1 tab(s) by mouth once a day   -- Do not take this drug if you are pregnant.  It is very important that you take or use this exactly as directed.  Do not skip doses or discontinue unless directed by your doctor.    -- Indication: For Birth Control

## 2019-08-12 NOTE — DISCHARGE NOTE OB - HOSPITAL COURSE
Patient had an  () with a right medio-lateral episiotomy. Postpartum course was complicated with a postpartum hemorrhage of 200cc on PPD#0 due to lower uterine segment atony. Patient was given cytotec UT and put out 1L UOP. On Postpartum day 2, patient was discharged home in stable condition, voiding spontaneously and with normal vital signs. Pt received her home Methadone and Xanax while hospitalized. Counseled on birth control and desires Micronor followed by Chiara IUD postpartum.

## 2019-08-12 NOTE — DISCHARGE NOTE OB - ADDITIONAL INSTRUCTIONS
Please call for  follow up  postpartum visit within 4-6  weeks of delivery date,  at Ambulatory Clinic Unit : [facility] :  [facility], 3rd floor : phone # [phone] or walk-in hours are: MONDAY 3-6 pm, WEDNESDAY 3-6 pm, FRIDAY 9-11 am, 1-3 pm

## 2019-08-12 NOTE — PROVIDER CONTACT NOTE (OTHER) - RECOMMENDATIONS
CBC ordered stat, and 500ml bolus of LR administered.
Cytotec AR and choudhary catheter to be inserted.
Notify MD. BARRETT to transfer to post partum unit?

## 2019-08-13 VITALS
RESPIRATION RATE: 18 BRPM | TEMPERATURE: 99 F | OXYGEN SATURATION: 100 % | SYSTOLIC BLOOD PRESSURE: 121 MMHG | HEART RATE: 76 BPM | DIASTOLIC BLOOD PRESSURE: 73 MMHG

## 2019-08-13 RX ADMIN — Medication 600 MILLIGRAM(S): at 11:00

## 2019-08-13 RX ADMIN — METHADONE HYDROCHLORIDE 175 MILLIGRAM(S): 40 TABLET ORAL at 12:27

## 2019-08-13 RX ADMIN — Medication 600 MILLIGRAM(S): at 10:05

## 2019-08-13 RX ADMIN — Medication 2 MILLIGRAM(S): at 03:19

## 2019-08-13 RX ADMIN — Medication 975 MILLIGRAM(S): at 03:19

## 2019-08-13 NOTE — PROGRESS NOTE ADULT - ASSESSMENT
A/P: 7yo HepC+ PPD#2 s/p  c/b RML episiotomy and subsequent PPH due to ROSANNE atony with  (500 total). At that time a choudhary was placed which immediately put out 1L urine. Choudhary was d/tosin when patient came to the floor. Has had adequate UOP. Patient is stable and doing well post-partum. A/P: 9yo HepC+, + drug abuse on methadone now PPD#2 s/p  c/b RML episiotomy and subsequent PPH due to ROSANNE atony with  (500 total). At that time a choudhary was placed which immediately put out 1L urine. Choudhary was d/tosin when patient came to the floor. Has had adequate UOP. Patient is stable and doing well post-partum.

## 2019-08-13 NOTE — PROGRESS NOTE ADULT - PROBLEM SELECTOR PLAN 1
- Pain well controlled, continue current pain regimen  - Increase ambulation, SCDs when not ambulating  - Continue regular diet  - c/w Methadone 175mg daily, Xanax 2mg TID  - Micronor for dispo  - Discharge planning     Carmen Frey, PGY-1
- Pain well controlled, continue current pain regimen  - Increase ambulation, SCDs when not ambulating  - Continue regular diet  - c/w Methadone 175mg daily, Xanax 2mg TID    Montesano Shante, PGY-1

## 2019-08-13 NOTE — PROGRESS NOTE ADULT - SUBJECTIVE AND OBJECTIVE BOX
OB Progress Note:  PPD#2    S: 29yo PPD#2 s/p . Patient feels well. Pain is well controlled, tolerating regular diet, passing flatus, voiding spontaneously, ambulating without difficulty. Denies heavy vaginal bleeding, CP/SOB, leadheadedness/dizziness, N/V.    O:  Vitals:   Vital Signs Last 24 Hrs  T(C): 37 (13 Aug 2019 06:00), Max: 37 (12 Aug 2019 17:56)  T(F): 98.6 (13 Aug 2019 06:00), Max: 98.6 (12 Aug 2019 17:56)  HR: 76 (13 Aug 2019 06:00) (73 - 76)  BP: 121/73 (13 Aug 2019 06:00) (108/62 - 121/73)  BP(mean): --  RR: 18 (13 Aug 2019 06:00) (18 - 18)  SpO2: 100% (13 Aug 2019 06:00) (100% - 100%)    MEDICATIONS  (STANDING):  acetaminophen   Tablet .. 975 milliGRAM(s) Oral <User Schedule>  diphtheria/tetanus/pertussis (acellular) Vaccine (ADAcel) 0.5 milliLiter(s) IntraMuscular once  ibuprofen  Tablet. 600 milliGRAM(s) Oral every 6 hours  methadone   Solution 175 milliGRAM(s) Oral daily  prenatal multivitamin 1 Tablet(s) Oral daily  sodium chloride 0.9% lock flush 3 milliLiter(s) IV Push every 8 hours    MEDICATIONS  (PRN):  ALPRAZolam 2 milliGRAM(s) Oral every 8 hours PRN anxiety  benzocaine 20%/menthol 0.5% Spray 1 Spray(s) Topical every 6 hours PRN for Perineal discomfort  dibucaine 1% Ointment 1 Application(s) Topical every 6 hours PRN Perineal discomfort  diphenhydrAMINE 25 milliGRAM(s) Oral every 6 hours PRN Pruritus  docusate sodium 100 milliGRAM(s) Oral two times a day PRN For stool softening  glycerin Suppository - Adult 1 Suppository(s) Rectal at bedtime PRN Constipation  hydrocortisone 1% Cream 1 Application(s) Topical every 6 hours PRN Moderate Pain (4-6)  lanolin Ointment 1 Application(s) Topical every 6 hours PRN nipple soreness  magnesium hydroxide Suspension 30 milliLiter(s) Oral two times a day PRN Constipation  oxyCODONE    IR 5 milliGRAM(s) Oral every 3 hours PRN Moderate to Severe Pain (4-10)  oxyCODONE    IR 5 milliGRAM(s) Oral once PRN Moderate to Severe Pain (4-10)  pramoxine 1%/zinc 5% Cream 1 Application(s) Topical every 4 hours PRN Moderate Pain (4-6)  simethicone 80 milliGRAM(s) Chew every 4 hours PRN Gas  witch hazel Pads 1 Application(s) Topical every 4 hours PRN Perineal discomfort      Labs:  Blood type: A Negative  Rubella IgG: Positive (07-10 @ 22:46)  RPR: Negative                          10.8<L>   12.98<H> >-----------< 178    (  @ 20:37 )             33.7<L>                        11.6   7.00 >-----------< 175    (  @ 01:55 )             36.3                  Physical Exam:  General: NAD  Abdomen: soft, non-tender, non-distended, fundus firm  Vaginal: No heavy vaginal bleeding  Extremities: No erythema/edema
Prolonged decel after scalp stimulation (accel audible but loss contact prior to decel)  cx 4/70/-2  reg ctxs  O2, lat position, ivf bolus  Spontaneous recovery  explained to pt that ISE may be needed despite Hep C status if unable to continuously monitor FHR  Also confirming with pharmacy that pt takes Xanax regularly, if so, continue same dosage to prevent withdrawl
OB Progress Note:  PPD#1    S: 27yo  PPD#1 s/p . Patient feels well. Pain is well controlled, tolerating regular diet, passing flatus, voiding spontaneously, ambulating without difficulty. Denies heavy vaginal bleeding, CP/SOB, N/V, lightheadedness/dizziness.     O:  Vitals:  Vital Signs Last 24 Hrs  T(C): 36.8 (12 Aug 2019 05:16), Max: 36.9 (11 Aug 2019 16:12)  T(F): 98.3 (12 Aug 2019 05:16), Max: 98.4 (11 Aug 2019 16:12)  HR: 68 (12 Aug 2019 05:16) (64 - 143)  BP: 112/66 (12 Aug 2019 05:16) (89/53 - 149/87)  BP(mean): --  RR: 18 (12 Aug 2019 05:16) (17 - 18)  SpO2: 100% (12 Aug 2019 05:16) (91% - 100%)    MEDICATIONS  (STANDING):  acetaminophen   Tablet .. 975 milliGRAM(s) Oral <User Schedule>  diphtheria/tetanus/pertussis (acellular) Vaccine (ADAcel) 0.5 milliLiter(s) IntraMuscular once  ibuprofen  Tablet. 600 milliGRAM(s) Oral every 6 hours  methadone   Solution 175 milliGRAM(s) Oral daily  prenatal multivitamin 1 Tablet(s) Oral daily  sodium chloride 0.9% lock flush 3 milliLiter(s) IV Push every 8 hours      Labs:  Blood type: A Negative  Rubella IgG: Positive (07-10 @ 22:46)  RPR: Negative                          10.8<L>   12.98<H> >-----------< 178    (  @ 20:37 )             33.7<L>                        11.6   7.00 >-----------< 175    (  @ 01:55 )             36.3      Physical Exam:  General: NAD  Abdomen: soft, non-tender, non-distended, fundus firm  Vaginal: No heavy vaginal bleeding  Extremities: No erythema/edema

## 2019-08-13 NOTE — PROGRESS NOTE ADULT - ATTENDING COMMENTS
Associate Chief of L & D Associate Chief of L & D    this patient was seen and evaluated by me this morning.  She was quite groggy this morning and reported that she was quite tired.  She denies any complaints.  Her PMH is significant for Hepatitis C and h/o drug abuse on methadone.  VS  T(C): 37 (19 @ 06:00)  HR: 76 (19 @ 06:00)  BP: 121/73 (19 @ 06:00)  RR: 18 (19 @ 06:00)  SpO2: 100% (19 @ 06:00)  Fundus firm  Lochia: scant  Ext; trace edema  s/p   routine PP care   contraception was discussed with the patient by Dr Frey  continue methadone treatment with [facility] methadone [facility]

## 2019-08-16 ENCOUNTER — APPOINTMENT (OUTPATIENT)
Dept: OBGYN | Facility: HOSPITAL | Age: 29
End: 2019-08-16

## 2019-08-27 PROBLEM — F19.10 OTHER PSYCHOACTIVE SUBSTANCE ABUSE, UNCOMPLICATED: Chronic | Status: ACTIVE | Noted: 2019-08-11

## 2019-08-27 PROBLEM — Z33.2 ENCOUNTER FOR ELECTIVE TERMINATION OF PREGNANCY: Chronic | Status: ACTIVE | Noted: 2019-08-11

## 2019-08-27 PROBLEM — O03.9 COMPLETE OR UNSPECIFIED SPONTANEOUS ABORTION WITHOUT COMPLICATION: Chronic | Status: ACTIVE | Noted: 2019-08-11

## 2019-09-27 ENCOUNTER — MED ADMIN CHARGE (OUTPATIENT)
Age: 29
End: 2019-09-27

## 2019-09-27 ENCOUNTER — APPOINTMENT (OUTPATIENT)
Dept: OBGYN | Facility: HOSPITAL | Age: 29
End: 2019-09-27

## 2019-09-27 ENCOUNTER — LABORATORY RESULT (OUTPATIENT)
Age: 29
End: 2019-09-27

## 2019-09-27 ENCOUNTER — OUTPATIENT (OUTPATIENT)
Dept: OUTPATIENT SERVICES | Facility: HOSPITAL | Age: 29
LOS: 1 days | End: 2019-09-27

## 2019-09-27 VITALS
SYSTOLIC BLOOD PRESSURE: 122 MMHG | BODY MASS INDEX: 30.91 KG/M2 | DIASTOLIC BLOOD PRESSURE: 84 MMHG | HEIGHT: 62 IN | HEART RATE: 85 BPM | WEIGHT: 168 LBS

## 2019-09-27 DIAGNOSIS — B18.2 CHRONIC VIRAL HEPATITIS C: ICD-10-CM

## 2019-09-27 DIAGNOSIS — F11.20 OPIOID DEPENDENCE, UNCOMPLICATED: ICD-10-CM

## 2019-09-27 DIAGNOSIS — F11.90 OPIOID USE, UNSPECIFIED, UNCOMPLICATED: ICD-10-CM

## 2019-09-27 LAB
ALBUMIN SERPL ELPH-MCNC: 4.3 G/DL — SIGNIFICANT CHANGE UP (ref 3.3–5)
ALP SERPL-CCNC: 132 U/L — HIGH (ref 40–120)
ALT FLD-CCNC: 70 U/L — HIGH (ref 4–33)
ANION GAP SERPL CALC-SCNC: 10 MMO/L — SIGNIFICANT CHANGE UP (ref 7–14)
AST SERPL-CCNC: 69 U/L — HIGH (ref 4–32)
BILIRUB SERPL-MCNC: 0.3 MG/DL — SIGNIFICANT CHANGE UP (ref 0.2–1.2)
BUN SERPL-MCNC: 14 MG/DL — SIGNIFICANT CHANGE UP (ref 7–23)
CALCIUM SERPL-MCNC: 9.6 MG/DL — SIGNIFICANT CHANGE UP (ref 8.4–10.5)
CHLORIDE SERPL-SCNC: 104 MMOL/L — SIGNIFICANT CHANGE UP (ref 98–107)
CO2 SERPL-SCNC: 28 MMOL/L — SIGNIFICANT CHANGE UP (ref 22–31)
CREAT SERPL-MCNC: 0.75 MG/DL — SIGNIFICANT CHANGE UP (ref 0.5–1.3)
GLUCOSE SERPL-MCNC: 74 MG/DL — SIGNIFICANT CHANGE UP (ref 70–99)
POTASSIUM SERPL-MCNC: 4.8 MMOL/L — SIGNIFICANT CHANGE UP (ref 3.5–5.3)
POTASSIUM SERPL-SCNC: 4.8 MMOL/L — SIGNIFICANT CHANGE UP (ref 3.5–5.3)
PROT SERPL-MCNC: 7.8 G/DL — SIGNIFICANT CHANGE UP (ref 6–8.3)
SODIUM SERPL-SCNC: 142 MMOL/L — SIGNIFICANT CHANGE UP (ref 135–145)

## 2019-09-27 NOTE — HISTORY OF PRESENT ILLNESS
[Delivery Date: ___] : on [unfilled] [] : delivered by vaginal delivery [Wt. ___] : weighing [unfilled] [Female] : Delivery History: baby girl [NICU: ___] : NICU: [unfilled] [Pertussis Vaccine] : Pertussis vaccine administered [Last Pap Date: ___] : Last Pap Date: [unfilled] [Resumed [place]] : has resumed intercourse [Intended Contraception] : Intended Contraception: [Back to Normal] : is back to normal in size [None] : no vaginal bleeding [Examination Of The Breasts] : breasts are normal [Rubella Vaccine] : Rubella vaccine was not administered [Resumed Menses] : has not resumed her menses [Breastfeeding] : not currently nursing [FreeTextEntry8] : I am here for a check up [FreeTextEntry9] : methadone maintanance late prenatal care hx of hepatitis C  aprazolam  [de-identified] : pt had intercourse 1 wk ago with condom [de-identified] : IUD [de-identified] : c/o abdominal bloating [de-identified] : s/p  late to prenatal care.methadone maintanance hepatitis c [de-identified] : cmp hepc rna GI referral gc chlamydia culture plan b dispensed declines condoms.pt has own condoms. pt advised               either not to have intercourse or always use condoms until she gets IUD.Pt to make appt for GI and gyn for IUD flu vaccine today. To ER for abdominal pain

## 2019-09-30 LAB
C TRACH RRNA SPEC QL NAA+PROBE: SIGNIFICANT CHANGE UP
HCV RNA SERPL NAA DL=5-ACNC: HIGH IU/ML
HCV RNA SPEC NAA+PROBE-LOG IU: 6.06 LOGIU/ML — HIGH
SPECIMEN SOURCE: SIGNIFICANT CHANGE UP

## 2019-10-04 ENCOUNTER — APPOINTMENT (OUTPATIENT)
Dept: OBGYN | Facility: HOSPITAL | Age: 29
End: 2019-10-04

## 2021-05-07 NOTE — DISCHARGE NOTE OB - PLAN OF CARE
standing/walking normal recovery Make your follow-up appointment with your doctor as ordered.   No heavy lifting, driving, or strenuous activity for 6 weeks.  Nothing per vagina such as tampons, intercourse, douches or tub baths for 6 weeks or until you see your doctor.  Call your doctor if you're unable to tolerate food, increase in vaginal bleeding, or have difficulty urinating. Call your doctor if you have pain that is not relieved by your perscribed medications. Notify your doctor with any other concerns.

## 2021-10-06 PROBLEM — F11.90 METHADONE USE: Status: ACTIVE | Noted: 2019-07-10

## 2024-04-10 NOTE — DISCHARGE NOTE OB - DRINK 8 TO 10 GLASSES OF FLUID EACH DAY
RN called the referral PA phone number and she states that the PA is still in the que to being worked on and once it is they will update us.  Will send the message to pt Statement Selected
